# Patient Record
(demographics unavailable — no encounter records)

---

## 2019-01-01 NOTE — NUR
SIPAP changed to 6cm 30%fio2.  continues to have retractions and flaring.  thick secretions 
suctioned PRN by RT.

## 2019-01-01 NOTE — NEWBORN INFANT-DISCHARGE
[place] Infant Discharge


Condition/Feeding


 Feeding Method:  Breast Milk-Exclusive





Discharge Examination


Level of Alertness:  Alert


Activity/State:  Active Alert, Quiet Alert


Skin:  Lanugo, Vernix


Head Circumference:  13.00


Fontanelles:  Soft, Flat


Anterior [place] Descriptio:  WNL


Sclera Description:  Clear; No Drainage


Ears:  Normal; No Low Set


Mouth, Nose, Eyes:  Hard & Soft Palate Intact; No Cleft Nares; Nares Patent 

Bilateral; No Cleft Palate


Neck:  Head Mobile, Clavicles Intact


Chest Circumference:  12.50


Cardiovascular:  Regular Rhythm


Respiratory:  Regular, Unlabored; No Retractions


Breath Sounds:  Clear, Equal; No Wheezes


Abdomen:  Soft; No Distended; Bowel Sounds Audible


Abdomen Circumference:  12.25


Genitalia:  Appear Normal


Back:  Spine Closed, Gluteal Folds Equal


Hips:  WNL


Movement:  Symmetric-Body, Full ROM, Symmetric-Face


Muscle Tone:  Active


Extremities:  5 digits present on each extremity


Reflexes:  [place], Grasp-Bilateral





Weight/Height


Birth Weight:  2835


Height (Inches):  18.50


Height (Calculated Centimeters:  46.276827


Weight (Pounds):  6


Weight (Ounces):  4


Weight (Calculated Kilograms):  2.515255


Weight (Calculated Grams):  2834.952





Vital Signs/Labs/SS


Vital Signs





Vital Signs








  Date Time  Temp Pulse Resp B/P (MAP) Pulse Ox O2 Delivery O2 Flow Rate FiO2


 


19 09:50  152   95  40.00 








Labs


Laboratory Tests


19 10:03: Glucometer 42


19 10:15: 


White Blood Count 9.1, Red Blood Count 5.78, Hemoglobin 18.2, Hematocrit 55, 

Mean Corpuscular Volume 94, Mean Corpuscular Hemoglobin 32, Mean Corpuscular 

Hemoglobin Concent 33, Red Cell Distribution Width 18.5H, Platelet Count 192, 

Mean Platelet Volume 11.4H, Neutrophils (%) (Auto) 43, Lymphocytes (%) (Auto) 43

, Monocytes (%) (Auto) 10, Eosinophils (%) (Auto) 3, Basophils (%) (Auto) 1, 

Neutrophils # (Auto) 3.9, Lymphocytes # (Auto) 3.9L, Monocytes # (Auto) 0.9, 

Eosinophils # (Auto) 0.3, Basophils # (Auto) 0.1, Neutrophils % (Manual) 49, 

Lymphocytes % (Manual) 41, Monocytes % (Manual) 8, Eosinophils % (Manual) 1, 

Basophils % (Manual) 0, Band Neutrophils 1, Nucleated Red Blood Cells 4, 

Polychromasia SLIGHT, Anisocytosis SLIGHT, Macrocytosis SLIGHT, Sodium Level 135

, Potassium Level 6.4H, Chloride Level 107, Carbon Dioxide Level 23, Anion Gap 5

, Blood Urea Nitrogen 4L, Creatinine 0.65, BUN/Creatinine Ratio 6, Glucose 

Level 50L, Calcium Level 9.2, C-Reactive Protein High Sensitivity 0.02


19 10:31: 


Arterial Blood Partial Pressure CO2 54H, Arterial Blood Partial Pressure O2 60, 

Arterial Blood HCO3 25H, Arterial Blood Oxygen Saturation 98H, Arterial Blood 

Base Excess -1.2, Capillary Blood pH 7.28L, Blood Gas Inspired Oxygen RA





Discharge Diagnosis/Plan


Discharge Diagnosis/Impression:  Birth, Infant, Living,  (<37 weeks)


Impression Note:


Baby Girl "Mak Phillips is a 36 wga, late- female infant born to 

a 27 year old G2 now P1 mother by primary . Mom was originally 

scheduled for  in a couple weeks due to history of hidradenitis 

suppurativa and MRSA with active vulvar lesions, however, mom had SROM this 

morning at 0510. GBS unknown. Mom had been on Clinda and Keflex for a couple of 

weeks but still had active lesions including one that ruptured in the process 

of placing her spinal block in the OR prior to delivery. Baby initially did 

well with good cry. Baby was suctioned and received CPT. By 5 minutes of life, 

baby was grunting and retracting with sats in the 70s. Baby was started 

initially on blow by at 100% and then quickly transitioned to CPAP at 40% FiO2. 

Baby was transferred to the nursery. CXR shows ground glass opacities 

concerning for RDS and fluid in the fissure. Baby was stabilized on SiPAP at 6 

at 30% FiO2. 





Maternal prenatal labs: A+, antibody neg, RI, HIV neg, Hep B neg, RPR NR, GBS 

unknown


Plan


- Transferred to [facility]AR[facility] Pruitt MD 2019 11:36

## 2019-01-01 NOTE — NUR
prints taken.  infant with decreased tone.  spitting up large amts thick mucoid fluid.  
suction PRN  HR 1325 spo2 98% SIPAP at 6cm fio2 30%. dr oropeza making arrangements for 
infant to transfer to NICU

## 2019-01-01 NOTE — NUR
IV D10W started at 10ml/hr/pump  stick times one with 24g jelco.  infant tolerated with 
difficulty.  tone decreased.  color pink tones with acrocyanosis

## 2019-01-01 NOTE — DIAGNOSTIC IMAGING REPORT
INDICATION: Respiratory distress.



FINDINGS: Cardiothymic silhouette is normal. There is diffuse

interstitial infiltrate in the lungs. There is no effusion or

pneumothorax.



IMPRESSION: Moderate to severe diffuse interstitial infiltrate.



Dictated by: 



  Dictated on workstation # [id number]

## 2019-01-01 NOTE — NUR
[facility]   report given.  preparing for transport

## 2019-01-01 NOTE — NUR
infant discharged to care of [facility] with transport team.  infant to mothers room for 
viewing by mom before leaving unit.

## 2019-01-01 NOTE — NUR
viable female infant delivered via  by dr chapman.  infant to radiant warmer per dr oropeza.  infant dried positioned and mouth and nares suctioned with bulb syringe.  thick 
secretions present.  color central cyanosis.

## 2019-01-01 NOTE — NEWBORN INFANT H&P-ADMISSION
Infant Record


Exam Date & Time


Date seen by provider:  2019


Time seen by provider:  09:25





Provider


PCP


Dr. Casey





Delivery Assessment


Expected Date of Delivery:  2019


Hx :  2


Hx Para:  1


Gestational Age in Weeks:  36


Gestational Age in Days:  0


Amniotic Membrane Rupture Time:  05:10


Delivery Date:  2019


Delivery Time:  09:34


Condition of Infant:  Living


Infant Delivery Method:  Primary  Section


Operative Indications ([place]es[place]:  maternal hidradenitis suppurativa with history 

of MRSA and active vulvar lesions


Anesthesia Type:  Spinal


Prenatal Events:   Labor <37 wks, Premature Rupture Membrane, Routine 

Prenatal care


Intrapartal Events:  None


Gender:  Female


Viability:  Living





Mother's Group Strep


Mother's Group B Strep:  Unknown





Maternal Labs


Blood Type:  A+


HIV:  neg


Hep B:  Negative


Rubella:  Immune





Apgar Score


Apgar Score at 1 Minute:  8


Apgar Score at 5 Minutes:  8





Condition/Feeding


Benefits of breastfeeding discussed with mother.


[place] Feeding Method:  Breast Milk-Exclusive


Gestation:  Single





Admission Examination


Level of Alertness:  Alert


Activity/State:  Active Alert, Quiet Alert


Skin:  Lanugo, Vernix


Fontanelles:  Soft, Flat


Anterior [place] Descriptio:  WNL


Sclera Description:  Clear; No Drainage


Ears:  Normal; No Low Set


Mouth, Nose, Eyes:  Hard & Soft Palate Intact; No Cleft Nares; Nares Patent 

Bilateral; No Cleft Palate


Neck:  Head Mobile, Clavicles Intact


Cardiovascular:  Regular Rhythm


Respiratory:  Regular, Unlabored; No Retractions


Breath Sounds:  Clear, Equal; No Wheezes


Abdomen:  Soft; No Distended; Bowel Sounds Audible


Genitalia:  Appear Normal


Back:  Spine Closed, Gluteal Folds Equal


Hips:  WNL


Movement:  Symmetric-Body, Full ROM, Symmetric-Face


Muscle Tone:  Active


Extremities:  5 digits present on each extremity


Reflexes:  [place], Grasp-Bilateral





Weight/Height


Birth Weight:  2835


Weight (Pounds):  6


Weight (Ounces):  4





Vital Signs





Vital Signs








  Date Time  Temp Pulse Resp B/P (MAP) Pulse Ox O2 Delivery O2 Flow Rate FiO2


 


19 09:50  152   95  40.00 








Laboratory Tests


19 10:03: Glucometer 42





Impression on Admission


Impression on Admission:  Birth, Infant, Living,  (<37 weeks)


Baby Girl "Mak Phillips is a 36 wga, late- female infant born to 

a 27 year old G2 now P1 mother by primary . Mom was originally 

scheduled for  in a couple weeks due to history of hidradenitis 

suppurativa and MRSA with active vulvar lesions, however, mom had SROM this 

morning at 0510. GBS unknown. Mom had been on Clinda and Keflex for a couple of 

weeks but still had active lesions including one that ruptured in the process 

of placing her spinal block in the OR prior to delivery. Baby initially did 

well with good cry. Baby was suctioned and received CPT. By 5 minutes of life, 

baby was grunting and retracting with sats in the 70s. Baby was started 

initially on blow by at 100% and then quickly transitioned to CPAP at 40% FiO2. 

Baby was transferred to the nursery. CXR shows ground glass opacities 

concerning for RDS and fluid in the fissure. Baby was stabilized on SiPAP at 6 

at 30% FiO2. 





Maternal prenatal labs: A+, antibody neg, RI, HIV neg, Hep B neg, RPR NR, GBS 

unknown





Progress/Plan/Problem List


Progress/Plan


- Admit to  nursery as level II 


- Currently on SiPAP at 6 with 30% FiO2. Will monitor and adjust as needed


- Ordered CBCd, CRP, CBG, BMP and blood culture. 


- Will also collect  screen to send to [facility] 


- IV placed and started on D10 at 80ml/kg/day (10ml/hr)


- Initial blood sugar was 41 prior to starting IV. Will repeat blood sugar on 

BMP now


- Due to maternal history of active infection and spont ROM with unknown GBS, 

will start Amp and Gent. 


- CXR obtained and concerning for RDS with ground glass opacities. May also 

have some signs of TTN due to fluid in the fissure. Discussed with father (mom 

is not fully alert due to medications received during labor) that baby has 

respiratory distress and may benefit from surfactant which we are not able to 

give at this hospital. Dad was in agreement with transfer to [facility]. 

Discussed with Dr. Matthew who accepts patient for transfer. 


- Baby will f/u with Dr. Casey after discharge


- I remained with baby in nursery from birth until 10:25 when [facility] transport 

arrived











SHARON CASEY MD 2019 10:31

## 2019-01-01 NOTE — NUR
hepatitis b vaccine  given LAT.  awaiting transport team to arrive.  continue to support 
airway with SIPAP.  infant continues to have retractions.  thick secretions suctioned PRN